# Patient Record
Sex: FEMALE | Race: OTHER | Employment: FULL TIME | URBAN - METROPOLITAN AREA
[De-identification: names, ages, dates, MRNs, and addresses within clinical notes are randomized per-mention and may not be internally consistent; named-entity substitution may affect disease eponyms.]

---

## 2017-01-21 ENCOUNTER — APPOINTMENT (OUTPATIENT)
Dept: LAB | Age: 32
End: 2017-01-21
Attending: FAMILY MEDICINE
Payer: COMMERCIAL

## 2017-01-21 ENCOUNTER — OFFICE VISIT (OUTPATIENT)
Dept: FAMILY MEDICINE CLINIC | Facility: CLINIC | Age: 32
End: 2017-01-21

## 2017-01-21 VITALS
BODY MASS INDEX: 27.64 KG/M2 | HEIGHT: 63 IN | HEART RATE: 71 BPM | WEIGHT: 156 LBS | DIASTOLIC BLOOD PRESSURE: 68 MMHG | SYSTOLIC BLOOD PRESSURE: 107 MMHG

## 2017-01-21 DIAGNOSIS — Z12.4 ROUTINE PAPANICOLAOU SMEAR: ICD-10-CM

## 2017-01-21 DIAGNOSIS — R10.33 UMBILICAL PAIN: ICD-10-CM

## 2017-01-21 DIAGNOSIS — Z00.00 ANNUAL PHYSICAL EXAM: Primary | ICD-10-CM

## 2017-01-21 DIAGNOSIS — Q82.5: ICD-10-CM

## 2017-01-21 DIAGNOSIS — Z00.00 ANNUAL PHYSICAL EXAM: ICD-10-CM

## 2017-01-21 LAB
CHOLEST SERPL-MCNC: 141 MG/DL (ref 110–200)
GLUCOSE SERPL-MCNC: 95 MG/DL (ref 70–99)
HDLC SERPL-MCNC: 44 MG/DL
LDLC SERPL CALC-MCNC: 74 MG/DL (ref 0–99)
NONHDLC SERPL-MCNC: 97 MG/DL
TRIGL SERPL-MCNC: 114 MG/DL (ref 1–149)

## 2017-01-21 PROCEDURE — 82947 ASSAY GLUCOSE BLOOD QUANT: CPT

## 2017-01-21 PROCEDURE — G0439 PPPS, SUBSEQ VISIT: HCPCS | Performed by: FAMILY MEDICINE

## 2017-01-21 PROCEDURE — 80061 LIPID PANEL: CPT

## 2017-01-21 PROCEDURE — 36415 COLL VENOUS BLD VENIPUNCTURE: CPT

## 2017-01-21 PROCEDURE — 99395 PREV VISIT EST AGE 18-39: CPT | Performed by: FAMILY MEDICINE

## 2017-01-21 NOTE — PROGRESS NOTES
REASON FOR VISIT:    Olivia Mccall is a 36year old female who presents for an [address].         Patient Active Problem List:     Routine gynecological examination     IUD (intrauterine device) in place     Right serous otitis media      No External Lab or Procedure   Breast Cancer Screening   Every 2 yrs age 54-69 There are no preventive care reminders to display for this patient.     Pap Every 3 yrs age 21-65 or Pap and HPV every 5 yrs age 33-67 Pap Smear,1 Years due on 07/18/2017    Chlamyd [OTHER] Father      good health   • High Cholesterol Mother       SOCIAL HISTORY:     Smoking Status: Never Smoker                      Smokeless Status: Never Used                        Alcohol Use: No              Occ: works : yes        REVIEW O Future    2. Routine Papanicolaou smear  referral  - OBG Referral - [facility] ([facility])    3. Nevus, congenital  Refer derm  - Derm Referral - [facility])    4.  Umbilical pain  Refer gen surg  - Surgery Referral - [facility]) - [place]

## 2017-03-18 ENCOUNTER — OFFICE VISIT (OUTPATIENT)
Dept: OBGYN CLINIC | Facility: CLINIC | Age: 32
End: 2017-03-18

## 2017-03-18 VITALS
SYSTOLIC BLOOD PRESSURE: 114 MMHG | DIASTOLIC BLOOD PRESSURE: 80 MMHG | BODY MASS INDEX: 27 KG/M2 | HEART RATE: 80 BPM | WEIGHT: 154 LBS

## 2017-03-18 DIAGNOSIS — Z01.419 WOMEN'S ANNUAL ROUTINE GYNECOLOGICAL EXAMINATION: Primary | ICD-10-CM

## 2017-03-18 PROCEDURE — 99395 PREV VISIT EST AGE 18-39: CPT | Performed by: OBSTETRICS & GYNECOLOGY

## 2017-03-18 NOTE — PROGRESS NOTES
HPI:    Patient ID: Talon Cleary is a 36year old female. HPI  Well woman visit  No complaints. Has ParaGard IUD for 5 years now. Menstrual cycles are regular and monthly and last 3 days. Not heavy and not painful.   No vaginal or urinary or pelvic c no mass, no tenderness and no fullness. Left adnexum displays no mass, no tenderness and no fullness. No erythema, tenderness or bleeding in the vagina. No signs of injury around the vagina. No vaginal discharge found. Breasts normal without masses.     P

## 2017-03-23 LAB — LAST PAP RESULT: NORMAL

## 2017-08-28 ENCOUNTER — APPOINTMENT (OUTPATIENT)
Dept: ULTRASOUND IMAGING | Facility: HOSPITAL | Age: 32
End: 2017-08-28
Attending: EMERGENCY MEDICINE
Payer: COMMERCIAL

## 2017-08-28 ENCOUNTER — HOSPITAL ENCOUNTER (EMERGENCY)
Facility: HOSPITAL | Age: 32
Discharge: HOME | End: 2017-08-28
Payer: COMMERCIAL

## 2017-08-28 ENCOUNTER — OFFICE VISIT (OUTPATIENT)
Dept: OBGYN CLINIC | Facility: CLINIC | Age: 32
End: 2017-08-28

## 2017-08-28 VITALS
HEART RATE: 80 BPM | RESPIRATION RATE: 18 BRPM | TEMPERATURE: 99 F | SYSTOLIC BLOOD PRESSURE: 115 MMHG | DIASTOLIC BLOOD PRESSURE: 74 MMHG | WEIGHT: 158 LBS | OXYGEN SATURATION: 100 % | HEIGHT: 64 IN | BODY MASS INDEX: 26.98 KG/M2

## 2017-08-28 VITALS
DIASTOLIC BLOOD PRESSURE: 71 MMHG | TEMPERATURE: 98 F | BODY MASS INDEX: 27 KG/M2 | SYSTOLIC BLOOD PRESSURE: 114 MMHG | WEIGHT: 154 LBS

## 2017-08-28 DIAGNOSIS — N63.20 LEFT BREAST MASS: Primary | ICD-10-CM

## 2017-08-28 DIAGNOSIS — N63.0 BREAST MASS IN FEMALE: Primary | ICD-10-CM

## 2017-08-28 LAB — B-HCG UR QL: NEGATIVE

## 2017-08-28 PROCEDURE — 76642 ULTRASOUND BREAST LIMITED: CPT | Performed by: EMERGENCY MEDICINE

## 2017-08-28 PROCEDURE — 99213 OFFICE O/P EST LOW 20 MIN: CPT | Performed by: ADVANCED PRACTICE MIDWIFE

## 2017-08-28 PROCEDURE — 81025 URINE PREGNANCY TEST: CPT

## 2017-08-28 PROCEDURE — 99284 EMERGENCY DEPT VISIT MOD MDM: CPT

## 2017-08-28 RX ORDER — CLINDAMYCIN HYDROCHLORIDE 300 MG/1
300 CAPSULE ORAL 3 TIMES DAILY
Qty: 30 CAPSULE | Refills: 0 | Status: SHIPPED | OUTPATIENT
Start: 2017-08-28 | End: 2017-09-07

## 2017-08-28 NOTE — ED PROVIDER NOTES
Patient Seen in: [facility] AND [facility] Emergency Department    History   Patient presents with:  Lump Mass (integumentary)    Stated Complaint: lump on left breast     HPI    42-year-old female to the emergency department with complaints of a lump to the lef clear  THROAT: mmm, no lesions  NECK: supple, no meningeal signs  LUNGS: no resp distress, cta bilateral  BREAST: lower left breast, there is a 7-8 cm mass, hard, tender, slightly moveable   CARDIO: RRR without murmur  GI: abdomen is soft and non tender, n inflammatory process such as a mastitis. There is no fluid component or abscess at this time, but this may develop and followup study is advised. Underlying malignancy is not excluded. Needle biopsy is advised. Recommend surgical consultation.       Dictate

## 2017-08-28 NOTE — PROGRESS NOTES
HPI:    Patient ID: Talon Cleary is a 36year old female. Pain started in  Left breast only on Friday. Noticed lump appearance yesterday. No nipple discharge. No change in texture of skin. No dimpling or indentation.   Has never had this occurrence

## 2017-08-28 NOTE — ED NOTES
Patient sent to ED from PCP for evaluation of left breast mass which she first noticed on Friday. Upon examination patients breasts are equal bilaterally, there is no discoloration or discharge. Areola normal in appearance.  There is a mass palpable on the

## 2017-08-28 NOTE — ED INITIAL ASSESSMENT (HPI)
Pt with pain to left breast since Friday- palpated a lump yesterday- seen by MD and sent to ED for US. No other complaints.

## 2017-08-29 ENCOUNTER — TELEPHONE (OUTPATIENT)
Dept: FAMILY MEDICINE CLINIC | Facility: CLINIC | Age: 32
End: 2017-08-29

## 2017-08-29 DIAGNOSIS — N63.20 LEFT BREAST MASS: Primary | ICD-10-CM

## 2017-08-29 NOTE — ED NOTES
Patient DC instructions reviewed. Patient understands to FU w/ referred oncology surgeon. US results given in written form. Prescription provided w/ education. Patient denies any further questions/concerns. Vitals stable.  Patient DC from ED w/ family in go

## 2017-08-30 ENCOUNTER — OFFICE VISIT (OUTPATIENT)
Dept: SURGERY | Facility: CLINIC | Age: 32
End: 2017-08-30

## 2017-08-30 VITALS
TEMPERATURE: 98 F | WEIGHT: 160.81 LBS | SYSTOLIC BLOOD PRESSURE: 113 MMHG | RESPIRATION RATE: 20 BRPM | HEART RATE: 78 BPM | HEIGHT: 64 IN | BODY MASS INDEX: 27.45 KG/M2 | DIASTOLIC BLOOD PRESSURE: 70 MMHG

## 2017-08-30 DIAGNOSIS — N63.20 LEFT BREAST MASS: Primary | ICD-10-CM

## 2017-08-30 DIAGNOSIS — N63.20 BREAST MASS, LEFT: ICD-10-CM

## 2017-08-30 PROCEDURE — 76642 ULTRASOUND BREAST LIMITED: CPT | Performed by: SURGERY

## 2017-08-30 PROCEDURE — 19100 BX BREAST PERCUT W/O IMAGE: CPT | Performed by: SURGERY

## 2017-08-30 PROCEDURE — 99244 OFF/OP CNSLTJ NEW/EST MOD 40: CPT | Performed by: SURGERY

## 2017-08-30 PROCEDURE — 88305 TISSUE EXAM BY PATHOLOGIST: CPT | Performed by: SURGERY

## 2017-08-30 NOTE — PROGRESS NOTES
Pt was seen in the ER for left breast lump. US done,   She noticed pain on Friday. Felt the lump on Sunday, saw her MD on Monday who sent her to the ER. She was started on ABX. Breast feels a little bit better.

## 2017-08-30 NOTE — PROGRESS NOTES
Education Record    Learner:  Patient    Disease / Diagnosis:procedure    Barriers / Limitations:  None   Comments:    Method:  Discussion   Comments:    General Topics:  Procedure   Comments:    Outcome:  Shows understanding   Comments:    Reviewed plan f

## 2017-08-31 NOTE — PROGRESS NOTES
Breast Surgery New Patient Consultation    This is the first visit for this 36year old woman, referred by Dr. Daryl New, who presents for evaluation of left breast mass.     History of Present Illness:   Ms. Mitchel Harvey is a 36year old woman who presents 30 capsule Rfl: 0       Allergies:    No Known Allergies    Family History:   Family History   Problem Relation Age of Onset   • Other [OTHER] Father      good health   • High Cholesterol Mother    • Diabetes Paternal Grandfather    • Cancer Neg        She stream, blood in the urine or vaginal/penile discharge. Skin:    The patient denies rash, itching, skin lesions, dry skin, change in skin color or change in moles.      Hematologic/Lymphatic:  The patient denies easily bruising or bleeding or persistent ,and areola appear normal. There is no skin dimpling with movement of the pectoralis. There is no nipple retraction. No nipple discharge can be elicited. The parenchyma is mildly nodular.  There are no dominant masses in the breast. The axillary tail is nor internal fluid collections identified.     We discussed the ultrasound in the context of her clinical exam.  Though clinically the patient has had improved tenderness with the antibiotics, the mass has not resolved and there is no known inciting event or et

## 2017-09-01 ENCOUNTER — TELEPHONE (OUTPATIENT)
Dept: SURGERY | Facility: CLINIC | Age: 32
End: 2017-09-01

## 2017-09-01 NOTE — TELEPHONE ENCOUNTER
Reviewed pathlogy with patient, per Dr Janay Garg mastitis. Pt has enough abx for 10 days. Given appt for Wednesday 10am.  She is feeling fine. No pain, area feels the same size. Pt verbalized understanding.

## 2017-09-06 ENCOUNTER — OFFICE VISIT (OUTPATIENT)
Dept: SURGERY | Facility: CLINIC | Age: 32
End: 2017-09-06

## 2017-09-06 ENCOUNTER — HOSPITAL ENCOUNTER (OUTPATIENT)
Dept: ULTRASOUND IMAGING | Facility: HOSPITAL | Age: 32
Discharge: HOME | End: 2017-09-06
Attending: SURGERY
Payer: COMMERCIAL

## 2017-09-06 ENCOUNTER — TELEPHONE (OUTPATIENT)
Dept: SURGERY | Facility: HOSPITAL | Age: 32
End: 2017-09-06

## 2017-09-06 ENCOUNTER — HOSPITAL ENCOUNTER (OUTPATIENT)
Facility: HOSPITAL | Age: 32
Discharge: HOME | End: 2017-09-06
Attending: SURGERY
Payer: COMMERCIAL

## 2017-09-06 ENCOUNTER — HOSPITAL ENCOUNTER (OUTPATIENT)
Dept: MAMMOGRAPHY | Facility: HOSPITAL | Age: 32
Discharge: HOME | End: 2017-09-06
Attending: SURGERY
Payer: COMMERCIAL

## 2017-09-06 VITALS
TEMPERATURE: 98 F | DIASTOLIC BLOOD PRESSURE: 77 MMHG | OXYGEN SATURATION: 99 % | SYSTOLIC BLOOD PRESSURE: 116 MMHG | HEART RATE: 75 BPM | RESPIRATION RATE: 18 BRPM

## 2017-09-06 DIAGNOSIS — N63.20 LEFT BREAST MASS: ICD-10-CM

## 2017-09-06 DIAGNOSIS — N63.20 LEFT BREAST MASS: Primary | ICD-10-CM

## 2017-09-06 DIAGNOSIS — R92.2 INCONCLUSIVE MAMMOGRAM DUE TO DENSE BREASTS: ICD-10-CM

## 2017-09-06 PROCEDURE — 99211 OFF/OP EST MAY X REQ PHY/QHP: CPT | Performed by: SURGERY

## 2017-09-06 PROCEDURE — 77066 DX MAMMO INCL CAD BI: CPT | Performed by: SURGERY

## 2017-09-06 PROCEDURE — 76642 ULTRASOUND BREAST LIMITED: CPT | Performed by: SURGERY

## 2017-09-06 PROCEDURE — 99214 OFFICE O/P EST MOD 30 MIN: CPT | Performed by: SURGERY

## 2017-09-06 NOTE — TELEPHONE ENCOUNTER
Pt returned the call. Explained that Dr Haresh Panda has ordered and MRI. Given scheduling phone number. Pt verbalized understanding. Pt c/o feeling nauseated after the mammogram/ US.    Explained that it's not related to the mammogram.  She has eaten, and w

## 2017-09-06 NOTE — PROGRESS NOTES
Breast Surgery Surveillance    History of Present Illness:   Ms. Kailee Reece is a 36year old woman who presented with palpable mass in the left breast that was painful and enlarging last week.   The patient reports that she has had no trauma and no recen Father      good health   • High Cholesterol Mother    • Diabetes Paternal Grandfather    • Cancer Neg        She is not of Ashkenazi Yazdanism ancestry.     Social History:    Alcohol use No         Smoking status: Never Smoker   Smokeless tobacco: Never Used or change in moles. Hematologic/Lymphatic:  The patient denies easily bruising or bleeding or persistent swollen glands or lymph nodes.      Musculoskeletal:  The patient denies muscle aches/pain, joint pain, stiff joints, neck pain, back pain or bone p nodular. There are no dominant masses in the breast. The axillary tail is normal.  Left breast:   The skin, nipple, and areola appear normal. There is no skin dimpling with movement of the pectoralis. There is no nipple retraction.  No nipple discharge can to have no change in the appearance on imaging, I will likely recommend an MRI of the breast for further clarification.   If she is noted to have a change to the ultrasound, she may benefit from a targeted ultrasound-guided biopsy of the area has the alecia

## 2017-09-06 NOTE — TELEPHONE ENCOUNTER
LVM asking patient to call me back. Explained that Dr Valentine Hutson, wants to order an MRI. Asked to call me back to help arrange.

## 2017-09-15 ENCOUNTER — HOSPITAL ENCOUNTER (OUTPATIENT)
Dept: MRI IMAGING | Facility: HOSPITAL | Age: 32
Discharge: HOME | End: 2017-09-15
Attending: SURGERY
Payer: COMMERCIAL

## 2017-09-15 DIAGNOSIS — R92.2 INCONCLUSIVE MAMMOGRAM DUE TO DENSE BREASTS: ICD-10-CM

## 2017-09-15 DIAGNOSIS — N63.20 LEFT BREAST MASS: ICD-10-CM

## 2017-09-18 ENCOUNTER — HOSPITAL ENCOUNTER (OUTPATIENT)
Dept: MRI IMAGING | Age: 32
Discharge: HOME | End: 2017-09-18
Attending: SURGERY
Payer: COMMERCIAL

## 2017-09-18 PROCEDURE — A9575 INJ GADOTERATE MEGLUMI 0.1ML: HCPCS | Performed by: SURGERY

## 2017-09-18 PROCEDURE — 0159T MRI BREAST (W+WO) W/CAD BILAT (CPT=77059/0159T): CPT | Performed by: SURGERY

## 2017-09-18 PROCEDURE — 77059 MRI BREAST (W+WO) W/CAD BILAT (CPT=77059/0159T): CPT | Performed by: SURGERY

## 2017-09-22 ENCOUNTER — TELEPHONE (OUTPATIENT)
Dept: SURGERY | Facility: CLINIC | Age: 32
End: 2017-09-22

## 2017-09-22 DIAGNOSIS — R22.32 AXILLARY MASS, LEFT: ICD-10-CM

## 2017-09-22 DIAGNOSIS — N63.20 LEFT BREAST MASS: Primary | ICD-10-CM

## 2017-09-22 NOTE — TELEPHONE ENCOUNTER
Whitney Jim returned the call. I explained that it is recommended to have biopsy of left breast and left axilla, from MRI result. Pt verbalized understanding. I reviewed that someone will contact her to schedule the procedure. Support offered.    I contacted

## 2017-09-22 NOTE — TELEPHONE ENCOUNTER
I let the patient know that someone would call her by Monday morning, and let her know they may be able to do the biopsy on Monday. Pt verbalized understanding, and will await the call.

## 2017-09-25 ENCOUNTER — NURSE NAVIGATOR ENCOUNTER (OUTPATIENT)
Dept: HEMATOLOGY/ONCOLOGY | Facility: HOSPITAL | Age: 32
End: 2017-09-25

## 2017-09-25 NOTE — PROGRESS NOTES
Navigator called to patient to discuss breast biopsy. Message left for patient to call Navigator back.

## 2017-09-25 NOTE — PROGRESS NOTES
Discussed recommended breast biopsy with patient. Reviewed pertinent patient history, family history of cancer, and patient medications.     - Discussed with patient Radiology’s protocol for being off blood thinning medications (including ibuprofen/advil that some soreness and bruising is normal after biopsy but that prolonged or increased pain and bruising should be reported to the ordering physician.    Reviewed results process with patient and discussed that Navigator will contact patient after biopsy fo

## 2017-09-27 ENCOUNTER — HOSPITAL ENCOUNTER (OUTPATIENT)
Dept: ULTRASOUND IMAGING | Facility: HOSPITAL | Age: 32
Discharge: HOME | End: 2017-09-27
Attending: SURGERY
Payer: COMMERCIAL

## 2017-09-27 ENCOUNTER — HOSPITAL ENCOUNTER (OUTPATIENT)
Dept: MAMMOGRAPHY | Facility: HOSPITAL | Age: 32
Discharge: HOME | End: 2017-09-27
Attending: SURGERY
Payer: COMMERCIAL

## 2017-09-27 VITALS
HEART RATE: 75 BPM | RESPIRATION RATE: 16 BRPM | SYSTOLIC BLOOD PRESSURE: 108 MMHG | OXYGEN SATURATION: 96 % | DIASTOLIC BLOOD PRESSURE: 69 MMHG

## 2017-09-27 DIAGNOSIS — N63.20 LEFT BREAST MASS: ICD-10-CM

## 2017-09-27 DIAGNOSIS — R22.32 AXILLARY MASS, LEFT: ICD-10-CM

## 2017-09-27 DIAGNOSIS — N63.0 BREAST MASS: ICD-10-CM

## 2017-09-27 PROCEDURE — 77065 DX MAMMO INCL CAD UNI: CPT | Performed by: SURGERY

## 2017-09-27 PROCEDURE — 88173 CYTOPATH EVAL FNA REPORT: CPT | Performed by: SURGERY

## 2017-09-27 PROCEDURE — 19084 BX BREAST ADD LESION US IMAG: CPT | Performed by: SURGERY

## 2017-09-27 PROCEDURE — 87205 SMEAR GRAM STAIN: CPT | Performed by: SURGERY

## 2017-09-27 PROCEDURE — 19083 BX BREAST 1ST LESION US IMAG: CPT | Performed by: SURGERY

## 2017-09-27 PROCEDURE — 87206 SMEAR FLUORESCENT/ACID STAI: CPT | Performed by: SURGERY

## 2017-09-27 PROCEDURE — 87070 CULTURE OTHR SPECIMN AEROBIC: CPT | Performed by: SURGERY

## 2017-09-27 PROCEDURE — 87077 CULTURE AEROBIC IDENTIFY: CPT | Performed by: SURGERY

## 2017-09-27 PROCEDURE — 87116 MYCOBACTERIA CULTURE: CPT | Performed by: SURGERY

## 2017-09-27 PROCEDURE — 87102 FUNGUS ISOLATION CULTURE: CPT | Performed by: SURGERY

## 2017-09-27 PROCEDURE — 87075 CULTR BACTERIA EXCEPT BLOOD: CPT | Performed by: SURGERY

## 2017-09-27 PROCEDURE — 88305 TISSUE EXAM BY PATHOLOGIST: CPT | Performed by: SURGERY

## 2017-09-27 PROCEDURE — 88172 CYTP DX EVAL FNA 1ST EA SITE: CPT | Performed by: SURGERY

## 2017-09-27 PROCEDURE — 88177 CYTP FNA EVAL EA ADDL: CPT | Performed by: SURGERY

## 2017-09-27 PROCEDURE — 10022 US BREAST BIOPSY WITH  CLIP 2 SITE LEFT: CPT | Performed by: SURGERY

## 2017-09-27 NOTE — IMAGING NOTE
PT ARRIVED TO ROOM 3   SCANS BY MATTHEW HERRING TAKEN PROCEDURE EXPLAINED QUESTIONS ANSWERED  PT STATES SHE HAD PAIN IN THE LEFT BREAST X 1 MONTH AGO. PT RELATES SHE VISITED HER PCP, WHO THEN FELT A PALPABLE MASS IN THE LEFT BREAST.  PT STATES SHE

## 2017-09-28 ENCOUNTER — TELEPHONE (OUTPATIENT)
Dept: SURGERY | Facility: CLINIC | Age: 32
End: 2017-09-28

## 2017-09-28 NOTE — TELEPHONE ENCOUNTER
I contacted the patient regarding her recent biopsy result. Reviewed that it showed inflammation, no cancer. Pt verbalized understanding.l  Reviewed that she made need steroids after she is over the infection.   Also explained that Dr Danielle Khanna would want

## 2017-10-02 ENCOUNTER — TELEPHONE (OUTPATIENT)
Dept: SURGERY | Facility: CLINIC | Age: 32
End: 2017-10-02

## 2017-10-02 DIAGNOSIS — N61.20 GRANULOMATOUS MASTITIS: Primary | ICD-10-CM

## 2017-10-02 RX ORDER — CEFADROXIL 500 MG/1
500 CAPSULE ORAL 2 TIMES DAILY
Qty: 20 CAPSULE | Refills: 0 | Status: SHIPPED | OUTPATIENT
Start: 2017-10-02 | End: 2017-10-11 | Stop reason: ALTCHOICE

## 2017-10-02 NOTE — TELEPHONE ENCOUNTER
Pt phoned in to reports that this morning she woke up with increased pain and redness in her left breast.  It is warm to touch and feels hard. She also feels like she has a fever, unsure bc she is at work. Will let Dr Edna Tan know, and call her back.

## 2017-10-02 NOTE — TELEPHONE ENCOUNTER
I talked with patient to let her know Dr Mike Smith ordered an abx for her. Reviewed that it is 1 capsule twice per day. Advised that she can take advil alternating with tylenol for pain. Also to be seen in the office on Wednesday. Appointment time given.

## 2017-10-04 ENCOUNTER — HOSPITAL ENCOUNTER (OUTPATIENT)
Facility: HOSPITAL | Age: 32
Discharge: HOME | End: 2017-10-04
Attending: SURGERY
Payer: COMMERCIAL

## 2017-10-04 ENCOUNTER — OFFICE VISIT (OUTPATIENT)
Dept: SURGERY | Facility: CLINIC | Age: 32
End: 2017-10-04

## 2017-10-04 VITALS
SYSTOLIC BLOOD PRESSURE: 109 MMHG | TEMPERATURE: 99 F | RESPIRATION RATE: 18 BRPM | OXYGEN SATURATION: 100 % | DIASTOLIC BLOOD PRESSURE: 70 MMHG

## 2017-10-04 DIAGNOSIS — N61.20 GRANULOMATOUS MASTITIS: Primary | ICD-10-CM

## 2017-10-04 PROCEDURE — 99211 OFF/OP EST MAY X REQ PHY/QHP: CPT | Performed by: SURGERY

## 2017-10-04 PROCEDURE — 99214 OFFICE O/P EST MOD 30 MIN: CPT | Performed by: SURGERY

## 2017-10-05 NOTE — PROGRESS NOTES
Breast Surgery Surveillance    History of Present Illness:   Ms. Gretchen Ontiveros is a 36year old woman who presented with palpable mass in the left breast that was painful and enlarging last week.   The patient reports that she has had no trauma and no recen breastfeeding history of 7 months, last unknown time ago. She achieved menarche at age 17 and LMP     She denies any history of oral contraceptive use. She denies infertility treatment to achieve pregnancy.     Medications:      Cefadroxil 500 MG Oral Cap constipation, yellowing of the skin, indigestion, nausea, change in bowel habits, diarrhea, abdominal pain or vomiting blood.      Genitourinary:  The patient denies frequent urination, needing to get up at night to urinate, urinary hesitancy or retaining u clear, non-icteric. Chest: The chest expands symmetrically. The lungs are clear to auscultation. Heart: The rhythm is regular. There are no murmurs, rubs, gallops or thrills. Breasts:  Her breasts are symmetrical with a cup size C.   Right breast: granulomatous mastitis. I explained that this is a chronic and unrelenting diagnosis that has no known inciting event. Presently, there is no good curative management though we do know that interventions tend to make the inflammatory response worse.   Meghana Olmedo

## 2017-10-11 ENCOUNTER — HOSPITAL ENCOUNTER (OUTPATIENT)
Facility: HOSPITAL | Age: 32
Discharge: HOME | End: 2017-10-11
Attending: SURGERY
Payer: COMMERCIAL

## 2017-10-11 ENCOUNTER — OFFICE VISIT (OUTPATIENT)
Dept: SURGERY | Facility: CLINIC | Age: 32
End: 2017-10-11

## 2017-10-11 VITALS
TEMPERATURE: 98 F | HEART RATE: 75 BPM | DIASTOLIC BLOOD PRESSURE: 73 MMHG | SYSTOLIC BLOOD PRESSURE: 106 MMHG | RESPIRATION RATE: 20 BRPM

## 2017-10-11 DIAGNOSIS — N61.20 GRANULOMATOUS MASTITIS: Primary | ICD-10-CM

## 2017-10-11 PROCEDURE — 99211 OFF/OP EST MAY X REQ PHY/QHP: CPT | Performed by: SURGERY

## 2017-10-11 PROCEDURE — 99214 OFFICE O/P EST MOD 30 MIN: CPT | Performed by: SURGERY

## 2017-10-11 RX ORDER — METHYLPREDNISOLONE 4 MG/1
TABLET ORAL
Qty: 1 PACKAGE | Refills: 0 | Status: SHIPPED | OUTPATIENT
Start: 2017-10-11 | End: 2017-11-01 | Stop reason: ALTCHOICE

## 2017-10-11 NOTE — PROGRESS NOTES
Pt reports that she does not have breast pain. Pain stopped last Thursday. Thursday just a little drainage, and now none.

## 2017-10-12 NOTE — PROGRESS NOTES
Breast Surgery Surveillance    History of Present Illness:   Ms. Zachery Benedict is a 36year old woman who presented with palpable mass in the left breast that was painful and enlarging.   The patient reports that she has had no trauma and no recent breast-f contraceptive use. She denies infertility treatment to achieve pregnancy. Medications:      methylPREDNISolone 4 MG Oral Tablet Therapy Pack Day 1: 2 before breakfast, 1 after lunch, 1 after dinner, 2 at bedtime. Day 2: 1 before breakfast, 1 after lunch history of present illness    Gastrointestinal:     There is no history of difficulty or pain with swallowing, reflux symptoms, vomiting, dark or bloody stools, constipation, yellowing of the skin, indigestion, nausea, change in bowel habits, diarrhea, abd enlarged and is without palpable masses/nodules. There are no palpable masses. The trachea is in the midline. Conjunctiva are clear, non-icteric. Chest: The chest expands symmetrically. The lungs are clear to auscultation. Heart:  The rhythm is regula pathology and we discussed this at length. She carries a diagnosis of idiopathic granulomatous mastitis. I explained that this is a chronic and unrelenting diagnosis that has no known inciting event.   Presently, there is no good curative management thoug

## 2017-10-18 ENCOUNTER — TELEPHONE (OUTPATIENT)
Dept: SURGERY | Facility: CLINIC | Age: 32
End: 2017-10-18

## 2017-10-19 ENCOUNTER — TELEPHONE (OUTPATIENT)
Dept: SURGERY | Facility: CLINIC | Age: 32
End: 2017-10-19

## 2017-10-19 NOTE — TELEPHONE ENCOUNTER
Pt reports that she is tolerating the steroid fine. Reports less inflammation in her breast, and no pain. She did notice some inflammation under her arm, where biopsy was done, not red or painful.

## 2017-10-26 ENCOUNTER — TELEPHONE (OUTPATIENT)
Dept: SURGERY | Facility: CLINIC | Age: 32
End: 2017-10-26

## 2017-10-26 DIAGNOSIS — N61.20 GRANULOMATOUS MASTITIS: ICD-10-CM

## 2017-10-26 RX ORDER — CEFADROXIL 500 MG/1
500 CAPSULE ORAL 2 TIMES DAILY
Qty: 20 CAPSULE | Refills: 0 | Status: SHIPPED | OUTPATIENT
Start: 2017-10-26 | End: 2017-11-05

## 2017-10-26 NOTE — TELEPHONE ENCOUNTER
Pt phoned in to report \"too much inflammation to her breast. \"  Pt says it started since last Friday. Pt reports redness to half her breast.  Denies pain, or swelling. She says her skin feels tight.   She occasionally takes Tylenol, and used ice that help

## 2017-10-26 NOTE — TELEPHONE ENCOUNTER
I contacted the patient to let her know Dr Sander Tapia ordered antibiotic for her. Confirmed it was received at the pharmacy. Explained to patient instructions, and that we will see her next Wednesday as scheduled. Pt verbalized understanding.

## 2017-11-01 ENCOUNTER — OFFICE VISIT (OUTPATIENT)
Dept: SURGERY | Facility: CLINIC | Age: 32
End: 2017-11-01

## 2017-11-01 VITALS
HEIGHT: 64.02 IN | SYSTOLIC BLOOD PRESSURE: 109 MMHG | DIASTOLIC BLOOD PRESSURE: 65 MMHG | WEIGHT: 160 LBS | TEMPERATURE: 98 F | RESPIRATION RATE: 18 BRPM | BODY MASS INDEX: 27.31 KG/M2 | HEART RATE: 74 BPM | OXYGEN SATURATION: 98 %

## 2017-11-01 DIAGNOSIS — N61.20 GRANULOMATOUS MASTITIS: ICD-10-CM

## 2017-11-01 PROCEDURE — 87205 SMEAR GRAM STAIN: CPT | Performed by: SURGERY

## 2017-11-01 PROCEDURE — 99214 OFFICE O/P EST MOD 30 MIN: CPT | Performed by: SURGERY

## 2017-11-01 PROCEDURE — 87070 CULTURE OTHR SPECIMN AEROBIC: CPT | Performed by: SURGERY

## 2017-11-01 RX ORDER — METHYLPREDNISOLONE 4 MG/1
4 TABLET ORAL DAILY
Qty: 30 TABLET | Refills: 0 | Status: SHIPPED | OUTPATIENT
Start: 2017-11-01 | End: 2017-11-29

## 2017-11-01 RX ORDER — METHYLPREDNISOLONE 4 MG/1
TABLET ORAL
Qty: 1 PACKAGE | Refills: 0 | Status: SHIPPED | OUTPATIENT
Start: 2017-11-01 | End: 2018-05-22

## 2017-11-01 NOTE — PROGRESS NOTES
I called medrol dose pack and additional dosing of steroid to her [facility] pharmacy. Reviewed with patient in detail. To return to see dr Amita Kumar after being on the steroid for at least 2 weeks. Pt to call sooner if any worsening symptoms.  Pt verbali

## 2017-11-29 ENCOUNTER — OFFICE VISIT (OUTPATIENT)
Dept: SURGERY | Facility: CLINIC | Age: 32
End: 2017-11-29

## 2017-11-29 VITALS
SYSTOLIC BLOOD PRESSURE: 108 MMHG | WEIGHT: 160 LBS | TEMPERATURE: 98 F | OXYGEN SATURATION: 98 % | HEIGHT: 64.02 IN | BODY MASS INDEX: 27.31 KG/M2 | RESPIRATION RATE: 20 BRPM | HEART RATE: 70 BPM | DIASTOLIC BLOOD PRESSURE: 73 MMHG

## 2017-11-29 DIAGNOSIS — N61.20 GRANULOMATOUS MASTITIS: ICD-10-CM

## 2017-11-29 PROCEDURE — 99214 OFFICE O/P EST MOD 30 MIN: CPT | Performed by: SURGERY

## 2017-11-29 RX ORDER — METHYLPREDNISOLONE 4 MG/1
4 TABLET ORAL DAILY
Qty: 30 TABLET | Refills: 0 | Status: SHIPPED | OUTPATIENT
Start: 2017-11-29 | End: 2018-05-22

## 2017-11-29 RX ORDER — DESONIDE 0.5 MG/ML
LOTION TOPICAL
Qty: 59 ML | Refills: 0 | Status: SHIPPED | OUTPATIENT
Start: 2017-11-29 | End: 2018-01-03

## 2018-01-03 ENCOUNTER — OFFICE VISIT (OUTPATIENT)
Dept: SURGERY | Facility: CLINIC | Age: 33
End: 2018-01-03

## 2018-01-03 ENCOUNTER — HOSPITAL ENCOUNTER (OUTPATIENT)
Facility: HOSPITAL | Age: 33
Discharge: HOME | End: 2018-01-03
Attending: SURGERY
Payer: COMMERCIAL

## 2018-01-03 VITALS
DIASTOLIC BLOOD PRESSURE: 77 MMHG | RESPIRATION RATE: 20 BRPM | SYSTOLIC BLOOD PRESSURE: 109 MMHG | HEART RATE: 83 BPM | TEMPERATURE: 99 F

## 2018-01-03 DIAGNOSIS — N61.20 GRANULOMATOUS MASTITIS: ICD-10-CM

## 2018-01-03 PROCEDURE — 99214 OFFICE O/P EST MOD 30 MIN: CPT | Performed by: SURGERY

## 2018-01-03 PROCEDURE — 99211 OFF/OP EST MAY X REQ PHY/QHP: CPT | Performed by: SURGERY

## 2018-01-03 RX ORDER — DESONIDE 0.5 MG/ML
LOTION TOPICAL
Qty: 59 ML | Refills: 0 | Status: SHIPPED | OUTPATIENT
Start: 2018-01-03 | End: 2018-05-22

## 2018-01-03 NOTE — PROGRESS NOTES
Breast Surgery Surveillance    History of Present Illness:   Ms. Tank Chapman is a 29year old woman who presented with palpable mass in the left breast that was painful and enlarging last week.   The patient reports that she has had no trauma and no recen Past Surgical History:  No date: CYST ASPIRATION LEFT    Gynecological History:  Pt is a   Pt was 23years old at time of first pregnancy. She has cumulative breastfeeding history of 7 months, last unknown time ago.   She achieved menarche at a history of present illness    Gastrointestinal:     There is no history of difficulty or pain with swallowing, reflux symptoms, vomiting, dark or bloody stools, constipation, yellowing of the skin, indigestion, nausea, change in bowel habits, diarrhea, abd enlarged and is without palpable masses/nodules. There are no palpable masses. The trachea is in the midline. Conjunctiva are clear, non-icteric. Chest: The chest expands symmetrically. The lungs are clear to auscultation. Heart:  The rhythm is regula visit.  She has been off of her steroids for approximately 1 week now with no signs of infection or worsening of inflammation. I recommended that she stop the steroids and continue a topical denosimide overlying the draining sinus.    I personally reviewed

## 2018-02-08 ENCOUNTER — TELEPHONE (OUTPATIENT)
Dept: SURGERY | Facility: CLINIC | Age: 33
End: 2018-02-08

## 2018-02-08 NOTE — TELEPHONE ENCOUNTER
Pt phoned in to report some inflammation in her left breast.  She noticed it yesterday and today is a little worse. It is red in the same area that was before. She denies having any pain, or fevers or chills. No drainage from nipple.    She is only using

## 2018-02-09 ENCOUNTER — TELEPHONE (OUTPATIENT)
Dept: SURGERY | Facility: CLINIC | Age: 33
End: 2018-02-09

## 2018-02-09 DIAGNOSIS — N61.0 MASTITIS: Primary | ICD-10-CM

## 2018-02-09 RX ORDER — METHYLPREDNISOLONE 4 MG/1
TABLET ORAL
Qty: 1 PACKAGE | Refills: 1 | Status: SHIPPED | OUTPATIENT
Start: 2018-02-09 | End: 2018-05-22

## 2018-02-09 NOTE — TELEPHONE ENCOUNTER
LVM letting patient know we called in the medrol dose pack for her to start today. It's the steroid she has taken in the past.   Asked her to call me on Monday or Tuesday to let me know how she's doing.

## 2018-02-26 NOTE — PROGRESS NOTES
Breast Surgery Surveillance    History of Present Illness:   Ms. Mitchel Harvey is a 29year old woman who presented with palpable mass in the left breast that was painful and enlarging.   The patient reports that she has had no trauma and no recent breast-f She denies any history of oral contraceptive use. She denies infertility treatment to achieve pregnancy. Medications:      methylPREDNISolone 4 MG Oral Tab Take 1 tablet (4 mg total) by mouth daily. Disp: 30 tablet Rfl: 0   [DISCONTINUED] Desonide 0. history of chest pain, chest pressure/discomfort, palpitations, irregular heartbeat, fainting or near-fainting, difficulty breathing when lying flat, SOB/Coughing at night, swelling of the legs or chest pain while walking.     Breasts:  See history of prese Exam:  The patient is an alert, oriented, well-nourished and  well-developed woman who appears her stated age. Her speech patterns and movements are normal. Her affect is appropriate. HEENT: The head is normocephalic. The neck is supple.  The thyroid is mastitis. Discussion and Plan:  I had a discussion with the Patient regarding her breast exam. On exam today I found a palpable mass in the area of her concern left breast that has not changed in size since her last visit.   I personally reviewed her rec

## 2018-05-22 ENCOUNTER — OFFICE VISIT (OUTPATIENT)
Dept: FAMILY MEDICINE CLINIC | Facility: CLINIC | Age: 33
End: 2018-05-22

## 2018-05-22 VITALS
SYSTOLIC BLOOD PRESSURE: 101 MMHG | HEART RATE: 80 BPM | BODY MASS INDEX: 28.7 KG/M2 | DIASTOLIC BLOOD PRESSURE: 65 MMHG | WEIGHT: 162 LBS | HEIGHT: 63 IN

## 2018-05-22 DIAGNOSIS — R20.0 BILATERAL HAND NUMBNESS: ICD-10-CM

## 2018-05-22 DIAGNOSIS — M25.50 POLYARTHRALGIA: Primary | ICD-10-CM

## 2018-05-22 PROCEDURE — 99213 OFFICE O/P EST LOW 20 MIN: CPT | Performed by: FAMILY MEDICINE

## 2018-05-22 PROCEDURE — 99212 OFFICE O/P EST SF 10 MIN: CPT | Performed by: FAMILY MEDICINE

## 2018-05-22 RX ORDER — PREDNISONE 20 MG/1
20 TABLET ORAL 2 TIMES DAILY
Qty: 10 TABLET | Refills: 0 | Status: SHIPPED | OUTPATIENT
Start: 2018-05-22 | End: 2018-05-27

## 2018-05-22 NOTE — PROGRESS NOTES
Pain in knees   Started with when she was going up stairs  Then a day later ankle pain and then hands   The hands feel asleep.   \"It feels like they are swollen but they aren't\"    No fever   No viral infection  No sob no cough      Physical exam well-hyd

## 2018-10-09 ENCOUNTER — TELEPHONE (OUTPATIENT)
Dept: FAMILY MEDICINE CLINIC | Facility: CLINIC | Age: 33
End: 2018-10-09

## 2018-10-09 DIAGNOSIS — N63.0 BREAST MASS: Primary | ICD-10-CM

## 2018-10-09 NOTE — TELEPHONE ENCOUNTER
Per pt she was on the phone with the referral dept. And they hung up on her. pls advise. She is requesting a referral to see Dr. Lauri Lopez. pls advise. Thank you    She states she needs it by tomorrow or else she can't come to her apt.

## 2018-10-10 ENCOUNTER — OFFICE VISIT (OUTPATIENT)
Dept: SURGERY | Facility: CLINIC | Age: 33
End: 2018-10-10

## 2018-10-10 VITALS
BODY MASS INDEX: 28.35 KG/M2 | HEIGHT: 63 IN | DIASTOLIC BLOOD PRESSURE: 66 MMHG | OXYGEN SATURATION: 100 % | RESPIRATION RATE: 18 BRPM | SYSTOLIC BLOOD PRESSURE: 107 MMHG | HEART RATE: 68 BPM | WEIGHT: 160 LBS

## 2018-10-10 DIAGNOSIS — N61.20 GRANULOMATOUS MASTITIS: Primary | ICD-10-CM

## 2018-10-10 PROCEDURE — 99214 OFFICE O/P EST MOD 30 MIN: CPT | Performed by: SURGERY

## 2018-10-10 NOTE — PROGRESS NOTES
Breast Surgery Surveillance    History of Present Illness:   Ms. Claudeen Peals is a 29year old woman who presented with palpable mass in the left breast that was painful and enlarging initially in October 2017.   The patient reports that she has had no t LEFT         Gynecological History:  Pt is a   Pt was 23years old at time of first pregnancy. She has cumulative breastfeeding history of 7 months, last unknown time ago.   She achieved menarche at age 17 and LMP     She denies any history of oral c illness    Gastrointestinal:     There is no history of difficulty or pain with swallowing, reflux symptoms, vomiting, dark or bloody stools, constipation, yellowing of the skin, indigestion, nausea, change in bowel habits, diarrhea, abdominal pain or vomi neck is supple. The thyroid is not enlarged and is without palpable masses/nodules. There are no palpable masses. The trachea is in the midline. Conjunctiva are clear, non-icteric. Chest: The chest expands symmetrically.  The lungs are clear to auscultat evidence of any active inflammation from her granulomatous mastitis. She has been off of her steroids for several months without signs of infection or recurrence of inflammation. No further treatment is therefore recommended at this time.   I have advised

## 2019-04-16 ENCOUNTER — OFFICE VISIT (OUTPATIENT)
Dept: FAMILY MEDICINE CLINIC | Facility: CLINIC | Age: 34
End: 2019-04-16

## 2019-04-16 VITALS
BODY MASS INDEX: 28.35 KG/M2 | HEIGHT: 63 IN | HEART RATE: 77 BPM | SYSTOLIC BLOOD PRESSURE: 101 MMHG | DIASTOLIC BLOOD PRESSURE: 66 MMHG | WEIGHT: 160 LBS | TEMPERATURE: 98 F

## 2019-04-16 DIAGNOSIS — N61.20 GRANULOMATOUS MASTITIS: ICD-10-CM

## 2019-04-16 DIAGNOSIS — Z00.00 ANNUAL PHYSICAL EXAM: Primary | ICD-10-CM

## 2019-04-16 DIAGNOSIS — Z12.4 ROUTINE PAPANICOLAOU SMEAR: ICD-10-CM

## 2019-04-16 PROCEDURE — 99395 PREV VISIT EST AGE 18-39: CPT | Performed by: FAMILY MEDICINE

## 2019-04-16 NOTE — PROGRESS NOTES
REASON FOR VISIT:    Xu Lake is a 34year old female who presents for an [address].     Has recurrent left breast cyst   Hx of mastitis POD [place]    Patient Active Problem List:     Routine gynecological examination     IUD (intraute diagnoses    ALLERGIES:   No Known Allergies  CURRENT MEDICATIONS:     No current outpatient medications on file.    MEDICAL INFORMATION:   Past Medical History:   Diagnosis Date   • Mastitis chronic 2017    idiopathic granulomatous mastitis      Past Surgi breast multiple surgical scars  At 5 oclock has 2 cm mass nontender.   LUNGS: clear to auscultation  CARDIO: RRR without murmur  GI: good BS's, no masses, HSM or tenderness  :deferred  RECTAL: deferred  MUSCULOSKELETAL: back is not tender, FROM of the wale

## 2019-04-22 ENCOUNTER — TELEPHONE (OUTPATIENT)
Dept: SURGERY | Facility: CLINIC | Age: 34
End: 2019-04-22

## 2019-04-22 ENCOUNTER — TELEPHONE (OUTPATIENT)
Dept: FAMILY MEDICINE CLINIC | Facility: CLINIC | Age: 34
End: 2019-04-22

## 2019-04-22 DIAGNOSIS — N61.20 GRANULOMATOUS MASTITIS: Primary | ICD-10-CM

## 2019-04-22 NOTE — TELEPHONE ENCOUNTER
Pt reporting having some inflammation in the breast, and a small lump. I let her know Dr Mony Mitchell is unavailable, and would arrange to have her see one of Dr Jey Reyes partners.

## 2019-04-22 NOTE — TELEPHONE ENCOUNTER
Patient called requesting a referral to see Dr. Delgado Mims. Pended referral. Please review diagnosis and sign off if you agree.     Thank you,  [facility] [phone]

## 2019-04-27 ENCOUNTER — APPOINTMENT (OUTPATIENT)
Dept: LAB | Age: 34
End: 2019-04-27
Attending: FAMILY MEDICINE
Payer: COMMERCIAL

## 2019-04-27 ENCOUNTER — OFFICE VISIT (OUTPATIENT)
Dept: OBGYN CLINIC | Facility: CLINIC | Age: 34
End: 2019-04-27

## 2019-04-27 VITALS
HEIGHT: 63 IN | BODY MASS INDEX: 27.64 KG/M2 | SYSTOLIC BLOOD PRESSURE: 104 MMHG | WEIGHT: 156 LBS | DIASTOLIC BLOOD PRESSURE: 62 MMHG

## 2019-04-27 DIAGNOSIS — Z00.00 ANNUAL PHYSICAL EXAM: ICD-10-CM

## 2019-04-27 DIAGNOSIS — Z01.419 WOMEN'S ANNUAL ROUTINE GYNECOLOGICAL EXAMINATION: Primary | ICD-10-CM

## 2019-04-27 DIAGNOSIS — Z97.5 IUD (INTRAUTERINE DEVICE) IN PLACE: ICD-10-CM

## 2019-04-27 PROBLEM — N61.20 GRANULOMATOUS MASTITIS: Status: RESOLVED | Noted: 2017-10-11 | Resolved: 2019-04-27

## 2019-04-27 PROCEDURE — 82947 ASSAY GLUCOSE BLOOD QUANT: CPT

## 2019-04-27 PROCEDURE — 99395 PREV VISIT EST AGE 18-39: CPT | Performed by: OBSTETRICS & GYNECOLOGY

## 2019-04-27 PROCEDURE — 36415 COLL VENOUS BLD VENIPUNCTURE: CPT

## 2019-04-27 PROCEDURE — 80061 LIPID PANEL: CPT

## 2019-04-27 NOTE — PROGRESS NOTES
HPI:    Patient ID: Virgilio Charles is a 34year old female. HPI  Well woman visit  Patient has ParaGard IUD now that seventh year. Menses are regular and every 28 days and lasting 3 to 4 days. Not heavy or painful.   Patient suffered from left breast Normocephalic. Normal hair distribution. No lesions. Neck: Normal range of motion. Adenopathy:  No supraclavicular or cervical adenopathy. Thyroid:  Normal size, shape, and position. No masses, tenderness, or nodules.   Cardiovascular: Normal rate Nontender, no masses. Perineum- normal without lesions  Anus-  Normal appearing without lesions.        ASSESSMENT/PLAN:   Women's annual routine gynecological examination  (primary encounter diagnosis)  Iud (intrauterine device) in place  History of left

## 2019-04-29 ENCOUNTER — OFFICE VISIT (OUTPATIENT)
Dept: SURGERY | Facility: CLINIC | Age: 34
End: 2019-04-29

## 2019-04-29 VITALS
TEMPERATURE: 99 F | DIASTOLIC BLOOD PRESSURE: 74 MMHG | RESPIRATION RATE: 16 BRPM | SYSTOLIC BLOOD PRESSURE: 108 MMHG | WEIGHT: 162 LBS | HEART RATE: 67 BPM | OXYGEN SATURATION: 100 % | BODY MASS INDEX: 29 KG/M2

## 2019-04-29 DIAGNOSIS — Z87.898: Primary | ICD-10-CM

## 2019-04-29 PROCEDURE — 99213 OFFICE O/P EST LOW 20 MIN: CPT | Performed by: SURGERY

## 2019-05-02 NOTE — PROGRESS NOTES
[facility]     Patient Name:  Casie Hernandes   YOB: 1985   Gender:  Female   Appt Date:  4/29/2019   Provider:  Chaim Lai MD   Insurance:  [id number] [facility]     PATIENT PROVIDERS  Referring Provid This is a very pleasant 74-year-old female who is known to the breast surgery service. Her history dates back to October 2017 when the patient developed left-sided breast pain and enlargement.   At that time, she underwent imaging which revealed a 4.9 cm h Reviewed Social History:  Social History    Socioeconomic History      Marital status: Single      Spouse name: Not on file      Number of children: Not on file      Years of education: Not on file      Highest education level: Not on file    Tobacco Use Pulmonary/Chest: Effort normal and breath sounds normal. No respiratory distress. She has no wheezes. She has no rales. Right breast exhibits no inverted nipple, no mass, no nipple discharge and no skin change.  Left breast exhibits no inverted nipple, no m

## 2019-11-06 ENCOUNTER — OFFICE VISIT (OUTPATIENT)
Dept: SURGERY | Facility: CLINIC | Age: 34
End: 2019-11-06

## 2019-11-06 VITALS
OXYGEN SATURATION: 98 % | SYSTOLIC BLOOD PRESSURE: 102 MMHG | DIASTOLIC BLOOD PRESSURE: 65 MMHG | RESPIRATION RATE: 16 BRPM | HEART RATE: 79 BPM

## 2019-11-06 DIAGNOSIS — N61.20 GRANULOMATOUS MASTITIS: Primary | ICD-10-CM

## 2019-11-06 PROCEDURE — 99214 OFFICE O/P EST MOD 30 MIN: CPT | Performed by: SURGERY

## 2019-11-07 NOTE — PROGRESS NOTES
Breast Surgery Surveillance    History of Present Illness:   Ms. Ernie Silverio is a 34year old woman who presented with palpable mass in the left breast that was painful and enlarging initially in October 2017.   The patient reports that she has had no t LEFT  2017       Gynecological History:  Pt is a [address]  Pt was 23years old at time of first pregnancy. She has cumulative breastfeeding history of 7 months, last unknown time ago.   She achieved menarche at age 17 and LMP     She denies any history of or See history of present illness    Gastrointestinal:     There is no history of difficulty or pain with swallowing, reflux symptoms, vomiting, dark or bloody stools, constipation, yellowing of the skin, indigestion, nausea, change in bowel habits, diarrhea, enlarged and is without palpable masses/nodules. There are no palpable masses. The trachea is in the midline. Conjunctiva are clear, non-icteric. Chest: The chest expands symmetrically. The lungs are clear to auscultation. Heart:  The rhythm is regula inflammation from her granulomatous mastitis. She has been off of her steroids for several months without signs of infection or recurrence of inflammation. No further treatment is therefore recommended at this time. I have advised no acute intervention.

## 2021-02-04 ENCOUNTER — TELEPHONE (OUTPATIENT)
Dept: FAMILY MEDICINE CLINIC | Facility: CLINIC | Age: 36
End: 2021-02-04

## 2021-02-04 NOTE — TELEPHONE ENCOUNTER
Patient new to [place] [facility].  Has physical scheduled 02/08 to establish care    COVID symptoms began 01/26  Tested positive for COVID on 01/29  Has been in contact with [facility]    No longer having symptoms     Please advise if OK to be s

## 2021-02-08 ENCOUNTER — OFFICE VISIT (OUTPATIENT)
Dept: FAMILY MEDICINE CLINIC | Facility: CLINIC | Age: 36
End: 2021-02-08

## 2021-02-08 VITALS
WEIGHT: 169 LBS | BODY MASS INDEX: 28.85 KG/M2 | HEART RATE: 75 BPM | DIASTOLIC BLOOD PRESSURE: 73 MMHG | HEIGHT: 64 IN | SYSTOLIC BLOOD PRESSURE: 109 MMHG

## 2021-02-08 DIAGNOSIS — Z00.00 PHYSICAL EXAM: Primary | ICD-10-CM

## 2021-02-08 PROCEDURE — 3008F BODY MASS INDEX DOCD: CPT | Performed by: FAMILY MEDICINE

## 2021-02-08 PROCEDURE — G0439 PPPS, SUBSEQ VISIT: HCPCS | Performed by: FAMILY MEDICINE

## 2021-02-08 PROCEDURE — 3074F SYST BP LT 130 MM HG: CPT | Performed by: FAMILY MEDICINE

## 2021-02-08 PROCEDURE — 3078F DIAST BP <80 MM HG: CPT | Performed by: FAMILY MEDICINE

## 2021-02-08 PROCEDURE — 99395 PREV VISIT EST AGE 18-39: CPT | Performed by: FAMILY MEDICINE

## 2021-02-08 NOTE — PROGRESS NOTES
2/8/2021  5:38 PM    Virgilio Charles is a 37year old female. Chief complaint(s): Patient presents with:  Routine Physical    HPI:     Virgilio Charles primary complaint is regarding CPE.      Virgilio Charles is a 37year old female present today for a ro Constitutional: Negative for appetite change, chills, fatigue and fever. HENT: Negative for ear pain, hearing loss and nosebleeds. Eyes: Negative for visual disturbance. Respiratory: Negative for apnea, shortness of breath and wheezing.     Cardiov or inguinal lymphadenopathy. Lower extremities without edema. Neurological: She is alert. She has normal strength. Reflex Scores:       Patellar reflexes are 2+ on the right side and 2+ on the left side. No focal neurological dificits.   Normal gait a may prevent transmission of infections as well as pregnancy. Contraception option chosen by patient was IUD. REFUSALS:  Although recommended, the patient refuses the following: none . FOLLOW-UP: Schedule a follow-up visit in 12 months.            Or

## 2021-03-20 ENCOUNTER — LAB ENCOUNTER (OUTPATIENT)
Dept: LAB | Age: 36
End: 2021-03-20
Attending: FAMILY MEDICINE
Payer: COMMERCIAL

## 2021-03-20 DIAGNOSIS — Z00.00 PHYSICAL EXAM: ICD-10-CM

## 2021-03-20 LAB
ALBUMIN SERPL-MCNC: 3.6 G/DL (ref 3.4–5)
ALBUMIN/GLOB SERPL: 1 {RATIO} (ref 1–2)
ALP LIVER SERPL-CCNC: 79 U/L
ALT SERPL-CCNC: 65 U/L
ANION GAP SERPL CALC-SCNC: 6 MMOL/L (ref 0–18)
AST SERPL-CCNC: 27 U/L (ref 15–37)
BASOPHILS # BLD AUTO: 0.03 X10(3) UL (ref 0–0.2)
BASOPHILS NFR BLD AUTO: 0.5 %
BILIRUB SERPL-MCNC: 0.4 MG/DL (ref 0.1–2)
BILIRUB UR QL: NEGATIVE
BUN BLD-MCNC: 7 MG/DL (ref 7–18)
BUN/CREAT SERPL: 11.3 (ref 10–20)
CALCIUM BLD-MCNC: 8.6 MG/DL (ref 8.5–10.1)
CHLORIDE SERPL-SCNC: 109 MMOL/L (ref 98–112)
CHOLEST SMN-MCNC: 142 MG/DL (ref ?–200)
CLARITY UR: CLEAR
CO2 SERPL-SCNC: 25 MMOL/L (ref 21–32)
COLOR UR: YELLOW
CREAT BLD-MCNC: 0.62 MG/DL
DEPRECATED RDW RBC AUTO: 39.5 FL (ref 35.1–46.3)
EOSINOPHIL # BLD AUTO: 0.08 X10(3) UL (ref 0–0.7)
EOSINOPHIL NFR BLD AUTO: 1.4 %
ERYTHROCYTE [DISTWIDTH] IN BLOOD BY AUTOMATED COUNT: 12.2 % (ref 11–15)
EST. AVERAGE GLUCOSE BLD GHB EST-MCNC: 97 MG/DL (ref 68–126)
GLOBULIN PLAS-MCNC: 3.5 G/DL (ref 2.8–4.4)
GLUCOSE BLD-MCNC: 98 MG/DL (ref 70–99)
GLUCOSE UR-MCNC: NEGATIVE MG/DL
HBA1C MFR BLD HPLC: 5 % (ref ?–5.7)
HCT VFR BLD AUTO: 37.6 %
HDLC SERPL-MCNC: 49 MG/DL (ref 40–59)
HGB BLD-MCNC: 12.5 G/DL
IMM GRANULOCYTES # BLD AUTO: 0.02 X10(3) UL (ref 0–1)
IMM GRANULOCYTES NFR BLD: 0.3 %
KETONES UR-MCNC: NEGATIVE MG/DL
LDLC SERPL CALC-MCNC: 75 MG/DL (ref ?–100)
LEUKOCYTE ESTERASE UR QL STRIP.AUTO: NEGATIVE
LYMPHOCYTES # BLD AUTO: 2.49 X10(3) UL (ref 1–4)
LYMPHOCYTES NFR BLD AUTO: 42.1 %
M PROTEIN MFR SERPL ELPH: 7.1 G/DL (ref 6.4–8.2)
MCH RBC QN AUTO: 29.9 PG (ref 26–34)
MCHC RBC AUTO-ENTMCNC: 33.2 G/DL (ref 31–37)
MCV RBC AUTO: 90 FL
MONOCYTES # BLD AUTO: 0.31 X10(3) UL (ref 0.1–1)
MONOCYTES NFR BLD AUTO: 5.2 %
NEUTROPHILS # BLD AUTO: 2.98 X10 (3) UL (ref 1.5–7.7)
NEUTROPHILS # BLD AUTO: 2.98 X10(3) UL (ref 1.5–7.7)
NEUTROPHILS NFR BLD AUTO: 50.5 %
NITRITE UR QL STRIP.AUTO: NEGATIVE
NONHDLC SERPL-MCNC: 93 MG/DL (ref ?–130)
OSMOLALITY SERPL CALC.SUM OF ELEC: 288 MOSM/KG (ref 275–295)
PATIENT FASTING Y/N/NP: YES
PATIENT FASTING Y/N/NP: YES
PH UR: 5 [PH] (ref 5–8)
PLATELET # BLD AUTO: 246 10(3)UL (ref 150–450)
POTASSIUM SERPL-SCNC: 4.3 MMOL/L (ref 3.5–5.1)
PROT UR-MCNC: NEGATIVE MG/DL
RBC # BLD AUTO: 4.18 X10(6)UL
SODIUM SERPL-SCNC: 140 MMOL/L (ref 136–145)
SP GR UR STRIP: 1.02 (ref 1–1.03)
TRIGL SERPL-MCNC: 90 MG/DL (ref 30–149)
TSI SER-ACNC: 1.53 MIU/ML (ref 0.36–3.74)
UROBILINOGEN UR STRIP-ACNC: <2
VLDLC SERPL CALC-MCNC: 18 MG/DL (ref 0–30)
WBC # BLD AUTO: 5.9 X10(3) UL (ref 4–11)

## 2021-03-20 PROCEDURE — 81015 MICROSCOPIC EXAM OF URINE: CPT | Performed by: FAMILY MEDICINE

## 2021-03-20 PROCEDURE — 83036 HEMOGLOBIN GLYCOSYLATED A1C: CPT

## 2021-03-20 PROCEDURE — 85025 COMPLETE CBC W/AUTO DIFF WBC: CPT

## 2021-03-20 PROCEDURE — 84443 ASSAY THYROID STIM HORMONE: CPT

## 2021-03-20 PROCEDURE — 81001 URINALYSIS AUTO W/SCOPE: CPT | Performed by: FAMILY MEDICINE

## 2021-03-20 PROCEDURE — 80061 LIPID PANEL: CPT

## 2021-03-20 PROCEDURE — 36415 COLL VENOUS BLD VENIPUNCTURE: CPT

## 2021-03-20 PROCEDURE — 80053 COMPREHEN METABOLIC PANEL: CPT

## 2021-03-20 PROCEDURE — 82306 VITAMIN D 25 HYDROXY: CPT | Performed by: FAMILY MEDICINE

## 2021-03-22 LAB — 25(OH)D3 SERPL-MCNC: 13.7 NG/ML (ref 30–100)

## 2021-03-22 RX ORDER — ERGOCALCIFEROL 1.25 MG/1
50000 CAPSULE ORAL WEEKLY
Qty: 12 CAPSULE | Refills: 4 | Status: SHIPPED | OUTPATIENT
Start: 2021-03-22 | End: 2021-04-21

## 2021-11-14 NOTE — PROGRESS NOTES
Patient here today for a follow up on inflammation to the left breast. Patient state that it has not grown since her last visit with Dr. Kylah Ying rash/swelling of legs

## 2022-01-10 ENCOUNTER — NURSE TRIAGE (OUTPATIENT)
Dept: FAMILY MEDICINE CLINIC | Facility: CLINIC | Age: 37
End: 2022-01-10

## 2022-01-10 NOTE — TELEPHONE ENCOUNTER
Action Requested: Summary for Provider     []  Critical Lab, Recommendations Needed  [] Need Additional Advice  [x]   FYI    []   Need Orders  [] Need Medications Sent to Pharmacy  []  Other     SUMMARY:   Spoke with pt,  verified, pt stated she rec'd 1

## 2022-10-08 ENCOUNTER — OFFICE VISIT (OUTPATIENT)
Dept: FAMILY MEDICINE CLINIC | Facility: CLINIC | Age: 37
End: 2022-10-08
Payer: COMMERCIAL

## 2022-10-08 VITALS
BODY MASS INDEX: 29.37 KG/M2 | WEIGHT: 172 LBS | HEART RATE: 89 BPM | HEIGHT: 64 IN | DIASTOLIC BLOOD PRESSURE: 76 MMHG | SYSTOLIC BLOOD PRESSURE: 120 MMHG

## 2022-10-08 DIAGNOSIS — R01.1 CARDIAC MURMUR: ICD-10-CM

## 2022-10-08 DIAGNOSIS — Z00.00 PHYSICAL EXAM: Primary | ICD-10-CM

## 2022-10-08 PROCEDURE — 87591 N.GONORRHOEAE DNA AMP PROB: CPT | Performed by: FAMILY MEDICINE

## 2022-10-08 PROCEDURE — 87491 CHLMYD TRACH DNA AMP PROBE: CPT | Performed by: FAMILY MEDICINE

## 2022-10-10 ENCOUNTER — TELEPHONE (OUTPATIENT)
Dept: FAMILY MEDICINE CLINIC | Facility: CLINIC | Age: 37
End: 2022-10-10

## 2022-10-10 DIAGNOSIS — R01.1 CARDIAC MURMUR: Primary | ICD-10-CM

## 2022-10-10 LAB
C TRACH DNA SPEC QL NAA+PROBE: NEGATIVE
N GONORRHOEA DNA SPEC QL NAA+PROBE: NEGATIVE

## 2022-10-10 NOTE — TELEPHONE ENCOUNTER
Patient requesting new order for cardiac echo, current order indicates for pediatric. Please call patient with  to update at [phone],[email or web address].

## 2022-10-15 ENCOUNTER — LAB ENCOUNTER (OUTPATIENT)
Dept: LAB | Age: 37
End: 2022-10-15
Attending: FAMILY MEDICINE
Payer: COMMERCIAL

## 2022-10-15 ENCOUNTER — HOSPITAL ENCOUNTER (OUTPATIENT)
Dept: CV DIAGNOSTICS | Facility: HOSPITAL | Age: 37
Discharge: HOME | End: 2022-10-15
Attending: FAMILY MEDICINE
Payer: COMMERCIAL

## 2022-10-15 DIAGNOSIS — Z00.00 PHYSICAL EXAM: ICD-10-CM

## 2022-10-15 DIAGNOSIS — R01.1 CARDIAC MURMUR: ICD-10-CM

## 2022-10-15 LAB
ALBUMIN SERPL-MCNC: 3.7 G/DL (ref 3.4–5)
ALBUMIN/GLOB SERPL: 1.1 {RATIO} (ref 1–2)
ALP LIVER SERPL-CCNC: 79 U/L
ALT SERPL-CCNC: 94 U/L
ANION GAP SERPL CALC-SCNC: 5 MMOL/L (ref 0–18)
AST SERPL-CCNC: 44 U/L (ref 15–37)
BASOPHILS # BLD AUTO: 0.03 X10(3) UL (ref 0–0.2)
BASOPHILS NFR BLD AUTO: 0.4 %
BILIRUB SERPL-MCNC: 0.7 MG/DL (ref 0.1–2)
BILIRUB UR QL: NEGATIVE
BILIRUB UR QL: NEGATIVE
BUN BLD-MCNC: 10 MG/DL (ref 7–18)
BUN/CREAT SERPL: 16.7 (ref 10–20)
CALCIUM BLD-MCNC: 8.8 MG/DL (ref 8.5–10.1)
CANCER AG125 SERPL-ACNC: 19.1 U/ML (ref ?–35)
CHLORIDE SERPL-SCNC: 109 MMOL/L (ref 98–112)
CHOLEST SERPL-MCNC: 140 MG/DL (ref ?–200)
CO2 SERPL-SCNC: 25 MMOL/L (ref 21–32)
COLOR UR: YELLOW
COLOR UR: YELLOW
CREAT BLD-MCNC: 0.6 MG/DL
DEPRECATED RDW RBC AUTO: 38.8 FL (ref 35.1–46.3)
EOSINOPHIL # BLD AUTO: 0.12 X10(3) UL (ref 0–0.7)
EOSINOPHIL NFR BLD AUTO: 1.7 %
ERYTHROCYTE [DISTWIDTH] IN BLOOD BY AUTOMATED COUNT: 11.9 % (ref 11–15)
EST. AVERAGE GLUCOSE BLD GHB EST-MCNC: 103 MG/DL (ref 68–126)
FASTING PATIENT LIPID ANSWER: YES
FASTING STATUS PATIENT QL REPORTED: YES
GFR SERPLBLD BASED ON 1.73 SQ M-ARVRAT: 121 ML/MIN/1.73M2 (ref 60–?)
GLOBULIN PLAS-MCNC: 3.5 G/DL (ref 2.8–4.4)
GLUCOSE BLD-MCNC: 94 MG/DL (ref 70–99)
GLUCOSE UR-MCNC: NEGATIVE MG/DL
GLUCOSE UR-MCNC: NEGATIVE MG/DL
HBA1C MFR BLD: 5.2 % (ref ?–5.7)
HCT VFR BLD AUTO: 38.4 %
HDLC SERPL-MCNC: 40 MG/DL (ref 40–59)
HGB BLD-MCNC: 12.9 G/DL
IMM GRANULOCYTES # BLD AUTO: 0.02 X10(3) UL (ref 0–1)
IMM GRANULOCYTES NFR BLD: 0.3 %
KETONES UR-MCNC: NEGATIVE MG/DL
KETONES UR-MCNC: NEGATIVE MG/DL
LDLC SERPL CALC-MCNC: 81 MG/DL (ref ?–100)
LEUKOCYTE ESTERASE UR QL STRIP.AUTO: NEGATIVE
LEUKOCYTE ESTERASE UR QL STRIP.AUTO: NEGATIVE
LYMPHOCYTES # BLD AUTO: 2.5 X10(3) UL (ref 1–4)
LYMPHOCYTES NFR BLD AUTO: 34.4 %
MCH RBC QN AUTO: 30.3 PG (ref 26–34)
MCHC RBC AUTO-ENTMCNC: 33.6 G/DL (ref 31–37)
MCV RBC AUTO: 90.1 FL
MONOCYTES # BLD AUTO: 0.44 X10(3) UL (ref 0.1–1)
MONOCYTES NFR BLD AUTO: 6.1 %
NEUTROPHILS # BLD AUTO: 4.15 X10 (3) UL (ref 1.5–7.7)
NEUTROPHILS # BLD AUTO: 4.15 X10(3) UL (ref 1.5–7.7)
NEUTROPHILS NFR BLD AUTO: 57.1 %
NITRITE UR QL STRIP.AUTO: NEGATIVE
NITRITE UR QL STRIP.AUTO: NEGATIVE
NONHDLC SERPL-MCNC: 100 MG/DL (ref ?–130)
OSMOLALITY SERPL CALC.SUM OF ELEC: 287 MOSM/KG (ref 275–295)
PH UR: 7 [PH] (ref 5–8)
PH UR: 7 [PH] (ref 5–8)
PLATELET # BLD AUTO: 265 10(3)UL (ref 150–450)
POTASSIUM SERPL-SCNC: 4 MMOL/L (ref 3.5–5.1)
PROT SERPL-MCNC: 7.2 G/DL (ref 6.4–8.2)
PROT UR-MCNC: 30 MG/DL
PROT UR-MCNC: 30 MG/DL
RBC # BLD AUTO: 4.26 X10(6)UL
RBC #/AREA URNS AUTO: >10 /HPF
RBC #/AREA URNS AUTO: >10 /HPF
SODIUM SERPL-SCNC: 139 MMOL/L (ref 136–145)
SP GR UR STRIP: 1.02 (ref 1–1.03)
SP GR UR STRIP: 1.02 (ref 1–1.03)
TRIGL SERPL-MCNC: 99 MG/DL (ref 30–149)
TSI SER-ACNC: 1.14 MIU/ML (ref 0.36–3.74)
UROBILINOGEN UR STRIP-ACNC: 2
UROBILINOGEN UR STRIP-ACNC: 2
VIT C UR-MCNC: NEGATIVE MG/DL
VIT C UR-MCNC: NEGATIVE MG/DL
VIT D+METAB SERPL-MCNC: 13.1 NG/ML (ref 30–100)
VLDLC SERPL CALC-MCNC: 16 MG/DL (ref 0–30)
WBC # BLD AUTO: 7.3 X10(3) UL (ref 4–11)

## 2022-10-15 PROCEDURE — 84443 ASSAY THYROID STIM HORMONE: CPT

## 2022-10-15 PROCEDURE — 36415 COLL VENOUS BLD VENIPUNCTURE: CPT

## 2022-10-15 PROCEDURE — 81001 URINALYSIS AUTO W/SCOPE: CPT

## 2022-10-15 PROCEDURE — 81001 URINALYSIS AUTO W/SCOPE: CPT | Performed by: FAMILY MEDICINE

## 2022-10-15 PROCEDURE — 93306 TTE W/DOPPLER COMPLETE: CPT | Performed by: FAMILY MEDICINE

## 2022-10-15 PROCEDURE — 86304 IMMUNOASSAY TUMOR CA 125: CPT

## 2022-10-15 PROCEDURE — 85025 COMPLETE CBC W/AUTO DIFF WBC: CPT

## 2022-10-15 PROCEDURE — 83036 HEMOGLOBIN GLYCOSYLATED A1C: CPT

## 2022-10-15 PROCEDURE — 80053 COMPREHEN METABOLIC PANEL: CPT

## 2022-10-15 PROCEDURE — 82306 VITAMIN D 25 HYDROXY: CPT

## 2022-10-15 PROCEDURE — 80061 LIPID PANEL: CPT

## 2022-10-16 RX ORDER — ERGOCALCIFEROL 1.25 MG/1
50000 CAPSULE ORAL WEEKLY
Qty: 12 CAPSULE | Refills: 4 | Status: SHIPPED | OUTPATIENT
Start: 2022-10-16 | End: 2022-11-15

## 2022-10-18 ENCOUNTER — LAB ENCOUNTER (OUTPATIENT)
Dept: LAB | Age: 37
End: 2022-10-18
Attending: FAMILY MEDICINE
Payer: COMMERCIAL

## 2022-10-18 DIAGNOSIS — R82.90 ABNORMAL URINALYSIS: ICD-10-CM

## 2022-10-18 PROCEDURE — 87086 URINE CULTURE/COLONY COUNT: CPT

## 2022-10-24 ENCOUNTER — OFFICE VISIT (OUTPATIENT)
Dept: FAMILY MEDICINE CLINIC | Facility: CLINIC | Age: 37
End: 2022-10-24
Payer: COMMERCIAL

## 2022-10-24 VITALS
HEART RATE: 74 BPM | SYSTOLIC BLOOD PRESSURE: 115 MMHG | WEIGHT: 173 LBS | DIASTOLIC BLOOD PRESSURE: 74 MMHG | BODY MASS INDEX: 29.53 KG/M2 | HEIGHT: 64 IN

## 2022-10-24 DIAGNOSIS — L20.84 INTRINSIC ECZEMA: ICD-10-CM

## 2022-10-24 DIAGNOSIS — R01.1 CARDIAC MURMUR: Primary | ICD-10-CM

## 2022-10-24 LAB — HPV I/H RISK 1 DNA SPEC QL NAA+PROBE: NEGATIVE

## 2022-10-24 PROCEDURE — 99213 OFFICE O/P EST LOW 20 MIN: CPT | Performed by: FAMILY MEDICINE

## 2022-10-24 PROCEDURE — 3078F DIAST BP <80 MM HG: CPT | Performed by: FAMILY MEDICINE

## 2022-10-24 PROCEDURE — 3008F BODY MASS INDEX DOCD: CPT | Performed by: FAMILY MEDICINE

## 2022-10-24 PROCEDURE — 3074F SYST BP LT 130 MM HG: CPT | Performed by: FAMILY MEDICINE

## 2022-10-24 RX ORDER — MOMETASONE FUROATE 1 MG/G
CREAM TOPICAL
Qty: 30 G | Refills: 1 | Status: SHIPPED | OUTPATIENT
Start: 2022-10-24

## 2023-02-20 NOTE — PROGRESS NOTES
Breast Surgery Surveillance    History of Present Illness:   Ms. Zoran Chi is a 29year old woman who presented with palpable mass in the left breast that was painful and enlarging.   The patient reports that she has had no trauma and no recent breast-f menarche at age 17 and LMP     She denies any history of oral contraceptive use. She denies infertility treatment to achieve pregnancy.     Medications:      methylPREDNISolone 4 MG Oral Tablet Therapy Pack Day 1: 2 before breakfast, 1 after lunch, 1 after chronic bronchitis, shortness of breath or abnormal sound when breathing. Cardiovascular:   There is no history of chest pain, chest pressure/discomfort, palpitations, irregular heartbeat, fainting or near-fainting, difficulty breathing when lying flat, Position: Sitting, Cuff Size: adult)   Pulse 74   Temp 98.3 °F (36.8 °C) (Oral)   Resp 18   Ht 1.626 m (5' 4.02\")   Wt 72.6 kg (160 lb)   SpO2 98%   BMI 27.45 kg/m²     Physical Exam:  The patient is an alert, oriented, well-nourished and  well-developed The extremities are without deformity, cyanosis or edema. Impression:   Ms. Baldomero Teague is a 29year old woman presents with left breast idiopathic granulomatous mastitis.     Discussion and Plan:  I had a discussion with the Patient regarding her beto Yes

## 2023-11-22 ENCOUNTER — TELEPHONE (OUTPATIENT)
Dept: OBGYN CLINIC | Facility: CLINIC | Age: 38
End: 2023-11-22

## 2023-11-22 NOTE — TELEPHONE ENCOUNTER
Patient verified name and     States she has had paragard for 11 years now. Wants it removed possibly replaced. Patient currently on menses, scheduled  with CYNTHIA. Aware of scheduling details.

## 2023-11-24 ENCOUNTER — OFFICE VISIT (OUTPATIENT)
Dept: OBGYN CLINIC | Facility: CLINIC | Age: 38
End: 2023-11-24
Payer: COMMERCIAL

## 2023-11-24 VITALS
HEIGHT: 64 IN | SYSTOLIC BLOOD PRESSURE: 132 MMHG | WEIGHT: 123 LBS | BODY MASS INDEX: 21 KG/M2 | DIASTOLIC BLOOD PRESSURE: 86 MMHG

## 2023-11-24 DIAGNOSIS — Z12.4 SCREENING FOR CERVICAL CANCER: ICD-10-CM

## 2023-11-24 DIAGNOSIS — Z30.432 ENCOUNTER FOR IUD REMOVAL: ICD-10-CM

## 2023-11-24 DIAGNOSIS — Z01.812 PRE-PROCEDURAL LABORATORY EXAMINATION: ICD-10-CM

## 2023-11-24 DIAGNOSIS — Z01.419 ENCOUNTER FOR GYNECOLOGICAL EXAMINATION WITHOUT ABNORMAL FINDING: Primary | ICD-10-CM

## 2023-11-24 LAB
CONTROL LINE PRESENT WITH A CLEAR BACKGROUND (YES/NO): YES YES/NO
KIT LOT #: NORMAL NUMERIC
PREGNANCY TEST, URINE: NEGATIVE

## 2023-11-24 PROCEDURE — 87624 HPV HI-RISK TYP POOLED RSLT: CPT | Performed by: OBSTETRICS & GYNECOLOGY

## 2023-11-27 LAB — HPV I/H RISK 1 DNA SPEC QL NAA+PROBE: NEGATIVE

## 2023-12-28 NOTE — ADDENDUM NOTE
Addended by: Eli Beatty on: 11/24/2023 04:12 PM     Modules accepted: Orders Yes - the patient is able to be screened

## 2024-01-05 ENCOUNTER — HOSPITAL ENCOUNTER (OUTPATIENT)
Age: 39
Discharge: HOME | End: 2024-01-05
Payer: COMMERCIAL

## 2024-01-05 ENCOUNTER — NURSE TRIAGE (OUTPATIENT)
Dept: FAMILY MEDICINE CLINIC | Facility: CLINIC | Age: 39
End: 2024-01-05

## 2024-01-05 VITALS
DIASTOLIC BLOOD PRESSURE: 69 MMHG | OXYGEN SATURATION: 99 % | TEMPERATURE: 101 F | HEART RATE: 111 BPM | SYSTOLIC BLOOD PRESSURE: 118 MMHG | RESPIRATION RATE: 18 BRPM

## 2024-01-05 DIAGNOSIS — U07.1 COVID-19: ICD-10-CM

## 2024-01-05 DIAGNOSIS — R50.9 FEVER, UNSPECIFIED FEVER CAUSE: Primary | ICD-10-CM

## 2024-01-05 LAB
S PYO AG THROAT QL: NEGATIVE
SARS-COV-2 RNA RESP QL NAA+PROBE: DETECTED

## 2024-01-05 RX ORDER — ACETAMINOPHEN 500 MG
1000 TABLET ORAL ONCE
Status: DISCONTINUED | OUTPATIENT
Start: 2024-01-05 | End: 2024-01-05

## 2024-01-05 RX ORDER — ACETAMINOPHEN 325 MG/1
975 TABLET ORAL ONCE
Status: COMPLETED | OUTPATIENT
Start: 2024-01-05 | End: 2024-01-05

## 2024-01-05 NOTE — TELEPHONE ENCOUNTER
Action Requested: Summary for Provider     []  Critical Lab, Recommendations Needed  [] Need Additional Advice  []   FYI    []   Need Orders  [] Need Medications Sent to Pharmacy  []  Other     SUMMARY: Patient calling with sore throat and fever, no available appointments today in clinic. Advised [facility] for eval today, mentioned walk in clinic although patient lives in [place] and [place] closer. Patient will go there for evaluation today.     Reason for call: Sore Throat  Onset: 2 days    Reason for Disposition   SEVERE sore throat pain    Protocols used: Sore Throat-A-OH

## 2024-01-05 NOTE — ED PROVIDER NOTES
Patient Seen in: [facility]      History     Chief Complaint   Patient presents with    Sore Throat    Fever     Stated Complaint: sore throat and fever    Subjective:   38-year-old female with no past medical history presents from home with complaint of fever and sore throat.  Symptoms for about 3 days.  No increased pain with swallowing.  Feels something in her throat.  Mild cough.  No shortness of breath.  Last Advil was at 2 AM.  No COVID testing done at home.  She is vaccinated for COVID.  Non-smoker    The history is provided by the patient. No  was used.         Objective:   No pertinent past medical history.        HISTORY:  Past Medical History:   Diagnosis Date    Mastitis chronic 2017    idiopathic granulomatous mastitis      Past Surgical History:   Procedure Laterality Date    CYST ASPIRATION LEFT  2017      Family History   Problem Relation Age of Onset    Other (Other) Father         good health    High Cholesterol Mother     Cancer Mother 55        Thyroid cancer    Other (Other) Mother 56    Diabetes Paternal Grandfather       Social History     Socioeconomic History    Marital status: Single   Tobacco Use    Smoking status: Never    Smokeless tobacco: Never   Vaping Use    Vaping Use: Never used   Substance and Sexual Activity    Alcohol use: No    Drug use: No            No pertinent past surgical history.              No pertinent social history.            Review of Systems    Positive for stated complaint: sore throat and fever  Other systems are as noted in HPI.  Constitutional and vital signs reviewed.      All other systems reviewed and negative except as noted above.    Physical Exam     ED Triage Vitals [01/05/24 0913]   /69   Pulse 118   Resp 18   Temp (!) 101.6 °F (38.7 °C)   Temp src Oral   SpO2 98 %   O2 Device None (Room air)       Current:/69   Pulse 111   Temp (!) 100.5 °F (38.1 °C)   Resp 18   LMP 11/22/2023   SpO2 99%          Physical Exam  Vitals and nursing note reviewed.   Constitutional:       General: She is not in acute distress.     Appearance: Normal appearance. She is not ill-appearing or toxic-appearing.   HENT:      Head: Normocephalic and atraumatic.      Right Ear: Tympanic membrane, ear canal and external ear normal.      Left Ear: Tympanic membrane, ear canal and external ear normal.      Nose: Nose normal.      Mouth/Throat:      Mouth: Mucous membranes are moist.      Pharynx: Oropharynx is clear. No pharyngeal swelling or posterior oropharyngeal erythema.      Tonsils: No tonsillar exudate or tonsillar abscesses.   Eyes:      Pupils: Pupils are equal, round, and reactive to light.   Cardiovascular:      Rate and Rhythm: Regular rhythm. Tachycardia present.      Pulses: Normal pulses.      Comments: , fever 101.6  Pulmonary:      Effort: Pulmonary effort is normal. No respiratory distress.      Breath sounds: Normal breath sounds.      Comments: Lungs clear.  No adventitious lung sounds.  No distress.  No hypoxia.  Pulse ox 98% ra. Which is normal    Abdominal:      General: Abdomen is flat.      Palpations: Abdomen is soft.   Musculoskeletal:         General: No signs of injury. Normal range of motion.      Cervical back: Normal range of motion and neck supple.   Lymphadenopathy:      Cervical: No cervical adenopathy.   Skin:     General: Skin is warm and dry.      Capillary Refill: Capillary refill takes less than 2 seconds.   Neurological:      General: No focal deficit present.      Mental Status: She is alert and oriented to person, place, and time.      GCS: GCS eye subscore is 4. GCS verbal subscore is 5. GCS motor subscore is 6.   Psychiatric:         Mood and Affect: Mood normal.         Behavior: Behavior normal.         Thought Content: Thought content normal.         Judgment: Judgment normal.               ED Course     Labs Reviewed   RAPID SARS-COV-2 BY PCR - Abnormal; Notable for the  following components:       Result Value    Rapid SARS-CoV-2 by PCR Detected (*)     All other components within normal limits   POCT RAPID STREP - Normal     Recent Results (from the past 24 hour(s))   POCT Rapid Strep    Collection Time: 01/05/24  9:18 AM   Result Value Ref Range    POCT Rapid Strep Negative Negative   Rapid SARS-CoV-2 by PCR    Collection Time: 01/05/24  9:34 AM    Specimen: Nares; Other   Result Value Ref Range    Rapid SARS-CoV-2 by PCR Detected (A) Not Detected         MDM        Medical Decision Making  COVID 19  Presents with isolated complaints of sore throat and fever  Rapid strep is negative  Benign throat exam.  No evidence of PTA.  COVID test is positive   discussed viral origin of COVID with the patient.  She is nontoxic-appearing.  No hypoxia.  Pulse ox 99% room air which is normal.  Slightly tachycardic here with low-grade fever.  Improved with Tylenol.  Discussed the option of Paxlovid.  Not recommended as patient is low risk with mild symptoms.  Patient declined Paxlovid.  Continue fever control with Tylenol and Motrin.  Stressed quarantine    Results and plan of care discussed with the patient/family. They are in agreement with discharge. They understand to follow up with their primary doctor or the referral physician for further evaluation, especially if no improvement.  Also discussed the limitations of immediate care, patient is aware that if symptoms are worse they should go to the emergency room. Verbal and written discharge instructions were given.         Problems Addressed:  COVID-19: acute illness or injury  Fever, unspecified fever cause: acute illness or injury    Amount and/or Complexity of Data Reviewed  Labs: ordered. Decision-making details documented in ED Course.    Risk  OTC drugs.        Disposition and Plan     Clinical Impression:  1. Fever, unspecified fever cause    2. COVID-19         Disposition:  Discharge  1/5/2024  9:53 am    Follow-up:  Aditya  MD Ayaan  [address] [place] [id number]  [phone]                Medications Prescribed:  Current Discharge Medication List

## 2024-01-05 NOTE — DISCHARGE INSTRUCTIONS
La prueba de COVID es positiva. La prueba de estreptococo es negativa. El COVID es un virus. No se indican antibióticos. Deaver Advil o Tylenol según sea necesario para la fiebre. Descansar. Aumentar los líquidos orales. Cuarentena estricta hasta el zachery. Luego use robbie máscara en público laura los próximos 5 días. Sebastien un seguimiento con santos médico según sea necesario.      COVID test is positive.  Strep test is negative.  COVID is a virus.  No antibiotics indicated.  Take Advil or Tylenol as needed for the fever.  Rest.  Increase oral fluids.  Strict quarantine through Sunday.  Then wear a mask in public for the next 5 days.  Follow-up with your doctor as needed

## 2024-01-31 NOTE — TELEPHONE ENCOUNTER
Dr Daryl New, Pt called requesting a referral for a follow-up visit with Dr Harsha Membreno. Pt's appointment with Dr Harsha Membreno is scheduled for tomorrow, 10/10. Please sign referral if you agree.  Thanks, IRVIN, [facility]
No indicators present

## 2024-05-03 NOTE — TELEPHONE ENCOUNTER
Please review; protocol failed/No Protocol    Last Office Visit: 10/22/2022  Sent Edwinahart to patient to schedule an appointment.     Requested Prescriptions   Pending Prescriptions Disp Refills    ergocalciferol 1.25 MG (42485 UT) Oral Cap 12 capsule 3     Sig: Take 1 capsule (50,000 Units total) by mouth once a week.       There is no refill protocol information for this order          Recent Outpatient Visits              5 months ago Encounter for gynecological examination without abnormal finding    [facility] - OB/GYN Milton Robbins MD    Office Visit    1 year ago Cardiac murmur    [facility], Ayaan Mcleod MD    Office Visit    1 year ago Physical exam    [facility], Ayaan Mcleod MD    Office Visit    3 years ago Physical exam    [facility], Ayaan Mcleod MD    Office Visit    4 years ago Granulomatous mastitis    [facility] Marcia Batista MD    Office Visit

## 2024-05-04 RX ORDER — ERGOCALCIFEROL 1.25 MG/1
50000 CAPSULE ORAL WEEKLY
Refills: 0 | OUTPATIENT
Start: 2024-05-04

## 2024-05-06 RX ORDER — ERGOCALCIFEROL 1.25 MG/1
50000 CAPSULE ORAL WEEKLY
Qty: 12 CAPSULE | Refills: 4 | OUTPATIENT
Start: 2024-05-06

## 2024-05-07 NOTE — TELEPHONE ENCOUNTER
DUPLICATE REQUEST .    Patient =please call and assists       LAST VISIT 10/24/2022.   No future appointments.      Patient has not yet read the [employer] message regarding the appointment .    Refill Request  Message [id number]  From  Jorge Luis Almonte CPhT To  Sintia Collins Sent and Delivered  5/3/2024  4:04 PM   Last Read in [employer]  Not Read       See refill request 5/3/24 denied by DR Burgess.    Disp Refills Start End    ergocalciferol 1.25 MG (75028 UT) Oral Cap -- 0 5/4/2024 --    Request refused: Appt required, please call patient    Sig - Route: Take 1 capsule (50,000 Units total) by mouth once a week. - Oral

## (undated) NOTE — LETTER
5/1/2019              [facility]         Dear Lynne Palacios,    It was a pleasure to see you. Your PAP test was normal with normal HPV cotesting. There is no need for further testing at this time.   I lo

## (undated) NOTE — LETTER
AUTHORIZATION FOR SURGICAL OPERATION OR OTHER PROCEDURE    1. I hereby authorize Dr. Valentin Tsai, and [address] [place] [address] staff assigned to my case to perform the following operation and/or procedure at the [address] [place] [address]:    IUD Removal    2. My physician has explained the nature and purpose of the operation or other procedure, possible alternative methods of treatment, the risks involved, and the possibility of complication to me. I acknowledge that no guarantee has been made as to the result that may be obtained. 3.  I recognize that, during the course of this operation, or other procedure, unforseen conditions may necessitate additional or different procedure than those listed above. I, therefore, further authorize and request that the above named physician, his/her physician assistants or designees perform such procedures as are, in his/her professional opinion, necessary and desirable. 4.  Any tissue or organs removed in the operation or other procedure may be disposed of by and at the discretion of the [address] [place] [address] and [facility]. 5.  I understand that in the event of a medical emergency, I will be transported by local paramedics to [facility] or other hospital emergency department. 6.  I certify that I have read and fully understand the above consent to operation and/or other procedure. 7.  I acknowledge that my physician has explained sedation/analgesia administration to me including the risks and benefits. I consent to the administration of sedation/analgesia as may be necessary or desirable in the judgement of my physician. Witness signature: ___________________________________________________ Date: 11/24/23                  Time:  ________ A. M.  P.M.        Patient Name:  ______________________________________________________  (please print)      Patient signature:  ___________________________________________________             Relationship to Patient:           []  Parent    Responsible person                          []  Spouse  In case of minor or                    [] Other  _____________   Incompetent name:  __________________________________________________                               (please print)      _____________      Responsible person  In case of minor or  Incompetent signature:  _______________________________________________    Statement of Physician  My signature below affirms that prior to the time of the procedure, I have explained to the patient and/or his/her guardian, the risks and benefits involved in the proposed treatment and any reasonable alternative to the proposed treatment. I have also explained the risks and benefits involved in the refusal of the proposed treatment and have answered the patient's questions.                         Date: 11/24/23  Provider                      Signature:  __________________________________________________________       Time:  ___________ ADILENE BLAS

## (undated) NOTE — MR AVS SNAPSHOT
[facility] [address]  [address]  [address] [facility]  [phone]               Thank you for choosing us for your health care visit with Olivia Ascencio. DO Madai.   We are glad to serve you and happy to provide you with this santos Surgery Referral - [facility]) - Adult & Peds    Complete by:  As directed    Assoc Dx:  Umbilical pain [I31.15]                 Referral Details     Referred By    Referred To    DO Jayro James appointment. Failure to obtain required authorization numbers can create reimbursement difficulties for you.     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  [address] [place] [address], If you have questions, you can call [phone] to talk to our [facility] Staff. Remember, [employer] is NOT to be used for urgent needs. For medical emergencies, dial 911.         Educational Information     Healthy Diet and Regular Exercise  The [facility]

## (undated) NOTE — MR AVS SNAPSHOT
[facility]  [address] [place] [address] [phone]               Thank you for choosing us for your health care visit with Yasir Valdez MD.  We are glad to serve you and happy to provide you with this summary of your visit. Your unique [employer] Access Code: [email or web address]  Expires: 3/22/2017  9:34 AM    If you have questions, you can call [phone] to talk to our [facility] Staff. Remember, [employer] is NOT to be used for urgent needs. For medical emergencies, dial 911.

## (undated) NOTE — LETTER
[address], [place], [place]     AUTHORIZATION FOR SURGICAL OPERATION OR PROCEDURE    1.  I hereby authorize Dr. Ozzie Quiroz, my Physician(s) and whomever may be designated as the doctor's Assistant, to perform the following ope 5. I consent to the photographing of procedure(s) to be performed for the purposes of advancing medicine, science and/or education, provided my identity is not revealed.  If the procedure has been videotaped, the physician/surgeon will obtain the original v (Witness signature)                                                                                                  (Date)                                (Time)  STATEMENT OF PHYSICIAN My signature below affirms that prior to the time of the procedure;  I

## (undated) NOTE — LETTER
Date & Time: 1/5/2024, 9:53 AM  Patient: Sintia Collins  Encounter Provider(s):    Pura Hernández APRN       To Whom It May Concern:    Sintia Collins was seen and treated in our department on 1/5/2024. She should not return to work until 1/8/23 - must wear a medical grade mask for 5 days .    If you have any questions or concerns, please do not hesitate to call.        _____________________________  Physician/APC Signature

## (undated) NOTE — ED AVS SNAPSHOT
Tank Chapman   MRN: [id number]    Department:  [facility] Emergency Department   Date of Visit:  8/28/2017           Disclosure     Insurance plans vary and the physician(s) referred by the ER may not be covered by your plan.  Please contact yo CARE PHYSICIAN AT ONCE OR RETURN IMMEDIATELY TO THE EMERGENCY DEPARTMENT. If you have been prescribed any medication(s), please fill your prescription right away and begin taking the medication(s) as directed.   If you believe that any of the medications